# Patient Record
Sex: FEMALE | Race: WHITE | Employment: FULL TIME | URBAN - METROPOLITAN AREA
[De-identification: names, ages, dates, MRNs, and addresses within clinical notes are randomized per-mention and may not be internally consistent; named-entity substitution may affect disease eponyms.]

---

## 2024-05-03 ENCOUNTER — OFFICE VISIT (OUTPATIENT)
Dept: ENDOCRINOLOGY | Facility: HOSPITAL | Age: 34
End: 2024-05-03
Payer: OTHER GOVERNMENT

## 2024-05-03 ENCOUNTER — TELEPHONE (OUTPATIENT)
Age: 34
End: 2024-05-03

## 2024-05-03 VITALS
OXYGEN SATURATION: 98 % | WEIGHT: 206.2 LBS | SYSTOLIC BLOOD PRESSURE: 120 MMHG | BODY MASS INDEX: 35.2 KG/M2 | HEART RATE: 90 BPM | DIASTOLIC BLOOD PRESSURE: 82 MMHG | HEIGHT: 64 IN

## 2024-05-03 DIAGNOSIS — E66.9 CLASS 2 OBESITY: ICD-10-CM

## 2024-05-03 DIAGNOSIS — R79.89 ABNORMAL THYROID SCREEN (BLOOD): ICD-10-CM

## 2024-05-03 DIAGNOSIS — E28.2 PCOS (POLYCYSTIC OVARIAN SYNDROME): ICD-10-CM

## 2024-05-03 DIAGNOSIS — E06.3 HASHIMOTO'S THYROIDITIS: Primary | ICD-10-CM

## 2024-05-03 PROCEDURE — 99244 OFF/OP CNSLTJ NEW/EST MOD 40: CPT | Performed by: STUDENT IN AN ORGANIZED HEALTH CARE EDUCATION/TRAINING PROGRAM

## 2024-05-03 RX ORDER — LEVOTHYROXINE SODIUM 50 MCG
50 TABLET ORAL DAILY
Qty: 60 TABLET | Refills: 1 | Status: SHIPPED | OUTPATIENT
Start: 2024-05-03 | End: 2024-05-07 | Stop reason: SDUPTHER

## 2024-05-03 RX ORDER — METFORMIN HYDROCHLORIDE 500 MG/1
1000 TABLET, EXTENDED RELEASE ORAL DAILY
COMMUNITY
Start: 2024-02-21

## 2024-05-03 RX ORDER — LEVOTHYROXINE SODIUM 50 MCG
TABLET ORAL
COMMUNITY
Start: 2024-04-19 | End: 2024-05-03 | Stop reason: SDUPTHER

## 2024-05-03 RX ORDER — MEDROXYPROGESTERONE ACETATE 10 MG/1
TABLET ORAL
COMMUNITY
Start: 2024-02-29

## 2024-05-03 RX ORDER — LEVOTHYROXINE SODIUM 0.03 MG/1
25 TABLET ORAL DAILY
Qty: 90 TABLET | Refills: 1 | Status: SHIPPED | OUTPATIENT
Start: 2024-05-03 | End: 2024-05-03

## 2024-05-03 NOTE — PROGRESS NOTES
Chief Complaint   Patient presents with    Hypothyroidism      Referring Provider  Tara Budinetz, [facility] - [address] [place]   [address]  [place] [address]     History of Present Illness:   Grant Khalil is a 33 y.o. female with elevated TPO Ab was referred to us for thyroid management during fertility with slight TSH above goal at 2.8 04/15/24. Other Pmhx of PCOS and also class 2 obesity      Aidan was diagnosed with PCOS at age 17 but then didn't like follow up until she was 30. Reports was started on metformin and also ovasitol but neither of these helped regulate her cycle. Got periodic progesterone to induce cycle. Currently still taking metformin 1000mg daily but has stopped ovasitol since having GI issues after a gall bladder surgery. Reports she is unable to lose weight despite trying.   Patient reports they have been trying for a while to get pregnant and hence saw [facility] and had prolactin checked was 13.9.,insulin 13.9, A1C 5.1%. TSH 2.8 and elevated Tpo Ab. Started on synthroid 50mcg and has been taking it for 2 weeks now    Fhx/social hx- see below. Is a opthalmologist    There is no problem list on file for this patient.     No past medical history on file.   No past surgical history on file.   No family history on file.  Social History     Tobacco Use    Smoking status: Not on file    Smokeless tobacco: Not on file   Substance Use Topics    Alcohol use: Not on file     Not on File    No current outpatient medications on file.  Review of Systems   Constitutional:  Negative for fatigue and unexpected weight change.   HENT:  Negative for trouble swallowing and voice change.    Eyes:  Negative for photophobia and visual disturbance.   Respiratory:  Negative for choking and shortness of breath.    Gastrointestinal:  Negative for constipation and diarrhea.   Endocrine: Negative for cold intolerance and heat intolerance.   Musculoskeletal:  Negative  "for arthralgias and myalgias.   Skin:  Negative for rash.       Physical Exam:  There is no height or weight on file to calculate BMI.  There were no vitals taken for this visit.   Wt Readings from Last 3 Encounters:   No data found for Wt       GEN: NAD  E/n/m nl facies, hearing intact bilat, tongue midline, lips nl  Eyes: no stare or proptosis, nl lids and conjunctiva, EOMI  Neck: trachea midline, thyroid NT to palpation, nl in size, no nodules or neck masses noted, no cervical LAD  CV; heart reg rate s1s2 nl, no m/r/g appreciated, no BUSTER  Resp: CTAB, good effort  Ab+BS  Neuro: no tremor, 2+ DTRs in BUE  MS: no c/c in digits, moves all 4 ext, nl muscle bulk, gait nl  Skin: warm and dry, no palmar erythema  Ext: no c/c in digits, no edema bilaterally, 2+ DP and PT pulses bilat, no breaks in skin/ulcers on feet, sensation intact to monofilament testing on plantar surfaces bilat  Psych: nl mood and affect, no gross lapses in memory    DATA:  Labs:     No results found for: \"TSH\", \"FREET4\", \"TSI\"    Radiology    Impression:  1. Hashimoto's thyroiditis    2. Abnormal thyroid screen (blood)           Plan:    Grant was seen today for hypothyroidism.    Diagnoses and all orders for this visit:    Hashimoto's thyroiditis    Abnormal thyroid screen (blood)  -     Ambulatory Referral to Endocrinology      There are no diagnoses linked to this encounter.  Hashimoto without hypothyroidism:- reviewed patients thyroid workup done as part of fertility management with showed elevated TPO Ab with mildly elevated TSH level for pregnancy reasons. Discussed TSH was 2.8 with goal TSH <2.5 for fertility purpose. Her T4 and T3 was normal and hence is not clinically hypothyroid but trying to optimize TSH would help her with fertility chances. Currently on synthroid 50mcg daily started on 2 weeks ok,  repeat TSH in 6 weeks and will aim to optimize her TSH    Advised that levotyroxine should be taken on an empty stomach. Should avoid " taking medications like iron, calcium, tums, [id number], PPI at the same time that may decrease absorption of T4. Should separate administration by 4hrs.    2. PCOS- discussed given her current plans of fertility can only address her weight and also ovulation a bit.   Would recommend c/w metformin 1000mg daily. Resume ovasitol and also recommend can try a small course of GLP-1 agonist as several case reports have been published towards improved fertility changes with use of GLP-1 agonist in patients with PCOS. Did discuss will have to abstain for sex though as needs to bee GLP-1 agonist for 2 months before planning pregnancy again but some weight loss can help improve her changes further. She is ok with this.   Will start on wegovy 0.25mg and if tolerated can increase to 0.5mg weekly in 1 month.   Will check insulin level now and in 3 months to trend     Discussed with the patient and all questioned fully answered. She will call me if any problems arise.    RTC in 3 months     Gissel Abrams MD

## 2024-05-03 NOTE — TELEPHONE ENCOUNTER
PA for wegovy    Submitted via    []CMM-KEY    [x][employer]-Case ID # [id number]   []Faxed to plan   []Other website    []Phone call Case ID #      Office notes sent, clinical questions answered. Awaiting determination    Turnaround time for your insurance to make a decision on your Prior Authorization can take 7-21 business days.

## 2024-05-06 DIAGNOSIS — E06.3 HASHIMOTO'S THYROIDITIS: ICD-10-CM

## 2024-05-06 DIAGNOSIS — R79.89 ABNORMAL THYROID SCREEN (BLOOD): ICD-10-CM

## 2024-05-06 NOTE — TELEPHONE ENCOUNTER
Phone call from patient Authorization needed for Wegovy - pharmacy on chart confirmed with patient. Per patient pharmacy also sent request, and informed her this morning that no response from office was received yet. Please contact patient when authorization has been received.

## 2024-05-06 NOTE — TELEPHONE ENCOUNTER
PA for wegovy Denied    Reason: Status:Denied       Message sent to office clinical pool Yes    Denial letter scanned into Media No      Appeal started No ( Provider will need to decide if appeal is warranted and send clinical documentation to PA team for initiation.)

## 2024-05-06 NOTE — TELEPHONE ENCOUNTER
PA for synthroid Denied    Reason: Status:Denied         Message sent to office clinical pool Yes    Denial letter scanned into Media No      Appeal started NO ( Provider will need to decide if appeal is warranted and send clinical documentation to PA team for initiation.)

## 2024-05-06 NOTE — TELEPHONE ENCOUNTER
PA for synthroid    Submitted via    []CMM-KEY    [x][employer]-Case ID # [id number]   []Faxed to plan   []Other website    []Phone call Case ID #      Office notes sent, clinical questions answered. Awaiting determination    Turnaround time for your insurance to make a decision on your Prior Authorization can take 7-21 business days.

## 2024-05-07 ENCOUNTER — TELEPHONE (OUTPATIENT)
Age: 34
End: 2024-05-07

## 2024-05-07 DIAGNOSIS — E06.3 HASHIMOTO'S THYROIDITIS: ICD-10-CM

## 2024-05-07 DIAGNOSIS — R79.89 ABNORMAL THYROID SCREEN (BLOOD): ICD-10-CM

## 2024-05-07 RX ORDER — LEVOTHYROXINE SODIUM 0.05 MG/1
50 TABLET ORAL DAILY
Qty: 60 TABLET | Refills: 0 | Status: SHIPPED | OUTPATIENT
Start: 2024-05-07

## 2024-05-07 NOTE — TELEPHONE ENCOUNTER
Spoke with patient, informed her of message regarding denial for Wegovy.  No denial letter scanned into chart.  Please advise on next steps for patient or if appeal is warranted.

## 2024-05-07 NOTE — TELEPHONE ENCOUNTER
Please send levothyroxine instead of synthroid to [facility] for 90 days with refills. Insurance is not covering synthroid. Synthroid would keep needing a PA

## 2024-05-07 NOTE — TELEPHONE ENCOUNTER
Phone call from patient for status on pending authorization - sent to triage nurse for further discussion upon reading notes in chart.

## 2024-05-12 NOTE — TELEPHONE ENCOUNTER
Duplicate PA encounter please see telephone encounter from 5/3/24 regarding SYNTHORID PA. Please review patient's chart to see status of PA and to document anything regarding this medication in regards to anything regarding the authorization process etc before creating another encounter Thank You.

## 2024-05-12 NOTE — TELEPHONE ENCOUNTER
Coverage is provided in situations where the patient is greater than or equal to 18 years of age  and has tried and failed or has a contraindication to phentermine, Qsymia or its individual generic  components, and Contrave or its individual generic components. Coverage cannot be authorized  at this time. Other coverage conditions may apply.

## 2024-05-13 DIAGNOSIS — E66.9 CLASS 2 OBESITY: Primary | ICD-10-CM

## 2024-05-13 RX ORDER — NALTREXONE HYDROCHLORIDE AND BUPROPION HYDROCHLORIDE 8; 90 MG/1; MG/1
TABLET, EXTENDED RELEASE ORAL DAILY
Qty: 100 TABLET | Refills: 1 | Status: SHIPPED | OUTPATIENT
Start: 2024-05-13 | End: 2024-06-10

## 2024-05-14 ENCOUNTER — TELEPHONE (OUTPATIENT)
Age: 34
End: 2024-05-14

## 2024-05-14 NOTE — TELEPHONE ENCOUNTER
PA for contrave 8-90    Submitted via    []CMM-KEY    [x][employer]-Case ID #  [id number]   []Faxed to plan   []Other website    []Phone call Case ID #      Office notes sent, clinical questions answered. Awaiting determination    Turnaround time for your insurance to make a decision on your Prior Authorization can take 7-21 business days.

## 2024-05-16 NOTE — TELEPHONE ENCOUNTER
PA for contrave Denied    Reason:        Message sent to office clinical pool Yes    Denial letter scanned into Media Yes    Appeal started No ( Provider will need to decide if appeal is warranted and send clinical documentation to PA team for initiation.)    **Please follow up with your patient regarding denial and next steps**

## 2024-05-22 LAB
GLUCOSE P FAST SERPL-MCNC: 73 MG/DL (ref 70–99)
INSULIN SERPL-ACNC: 13.7 UIU/ML (ref 2.6–24.9)